# Patient Record
Sex: MALE | Race: WHITE | NOT HISPANIC OR LATINO | URBAN - METROPOLITAN AREA
[De-identification: names, ages, dates, MRNs, and addresses within clinical notes are randomized per-mention and may not be internally consistent; named-entity substitution may affect disease eponyms.]

---

## 2020-07-13 ENCOUNTER — OFFICE VISIT (OUTPATIENT)
Dept: URBAN - METROPOLITAN AREA CLINIC 113 | Facility: CLINIC | Age: 50
End: 2020-07-13

## 2020-07-25 ENCOUNTER — TELEPHONE ENCOUNTER (OUTPATIENT)
Dept: URBAN - METROPOLITAN AREA CLINIC 13 | Facility: CLINIC | Age: 50
End: 2020-07-25

## 2020-07-26 ENCOUNTER — TELEPHONE ENCOUNTER (OUTPATIENT)
Dept: URBAN - METROPOLITAN AREA CLINIC 13 | Facility: CLINIC | Age: 50
End: 2020-07-26

## 2020-07-26 RX ORDER — PREDNISONE 20 MG/1
TAKE 1 TABLET BY MOUTH TWICE DAILY FOR 5 DAYS TABLET ORAL
Qty: 10 | Refills: 0 | Status: ACTIVE | COMMUNITY
Start: 2019-10-07

## 2020-07-26 RX ORDER — DOXYCYCLINE HYCLATE 100 MG/1
TAKE 1 TABLET BY MOUTH EVERY 12 HOURS FOR 10 DAYS TABLET ORAL
Qty: 20 | Refills: 0 | Status: ACTIVE | COMMUNITY
Start: 2020-03-13

## 2020-07-26 RX ORDER — POLYETHYLENE GLYCOL 3350, SODIUM CHLORIDE, SODIUM BICARBONATE AND POTASSIUM CHLORIDE WITH LEMON FLAVOR 420; 11.2; 5.72; 1.48 G/4L; G/4L; G/4L; G/4L
TAKE AS DIRECTED POWDER, FOR SOLUTION ORAL
Qty: 4000 | Refills: 0 | Status: ACTIVE | COMMUNITY
Start: 2020-07-10

## 2020-07-26 RX ORDER — FLUTICASONE PROPIONATE 50 UG/1
USE 2 SPRAY(S) IN EACH NOSTRIL ONCE DAILY FOR 30 DAYS SPRAY, METERED NASAL
Qty: 16 | Refills: 0 | Status: ACTIVE | COMMUNITY
Start: 2019-10-07

## 2020-07-26 RX ORDER — CIPROFLOXACIN HYDROCHLORIDE 500 MG/1
TAKE 1 TABLET BY MOUTH TWICE DAILY TABLET, FILM COATED ORAL
Qty: 28 | Refills: 0 | Status: ACTIVE | COMMUNITY
Start: 2020-06-01

## 2020-07-26 RX ORDER — CLOBETASOL PROPIONATE 0.5 MG/G
APPLY CREAM TOPICALLY TO AFFECTED AREA TWICE DAILY FOR 2 WEEKS ON THEN CREAM TOPICAL
Qty: 15 | Refills: 0 | Status: ACTIVE | COMMUNITY
Start: 2019-07-25

## 2020-07-26 RX ORDER — FLUCONAZOLE 200 MG/1
TAKE 1 TABLET BY MOUTH ONCE DAILY FOR 14 DAYS TABLET ORAL
Qty: 14 | Refills: 0 | Status: ACTIVE | COMMUNITY
Start: 2019-10-02

## 2020-07-26 RX ORDER — CLINDAMYCIN PHOSPHATE 10 MG/ML
APPLY LOTION TOPICALLY TO AFFECTED AREA TWICE DAILY LOTION TOPICAL
Qty: 60 | Refills: 0 | Status: ACTIVE | COMMUNITY
Start: 2019-10-30

## 2020-07-26 RX ORDER — CEPHALEXIN 500 MG/1
TAKE 1 CAPSULE BY MOUTH EVERY 8 HOURS FOR 10 DAYS CAPSULE ORAL
Qty: 30 | Refills: 0 | Status: ACTIVE | COMMUNITY
Start: 2019-12-27

## 2020-07-26 RX ORDER — MUPIROCIN 20 MG/G
APPLY OINTMENT TOPICALLY TO AFFECTED AREA THREE TIMES DAILY FOR 10 DAY OINTMENT TOPICAL
Qty: 22 | Refills: 0 | Status: ACTIVE | COMMUNITY
Start: 2020-03-16

## 2020-07-26 RX ORDER — AMITRIPTYLINE HYDROCHLORIDE 10 MG/1
TAKE 1 TABLET BY MOUTH ONCE DAILY AT BEDTIME FOR 30 DAYS TABLET, FILM COATED ORAL
Qty: 30 | Refills: 0 | Status: ACTIVE | COMMUNITY
Start: 2020-03-13

## 2020-07-26 RX ORDER — PREDNISONE 10 MG/1
TAKE 6 TABLETS BY MOUTH ONCE DAILY FOR 3 DAYS THEN DECREASE BY 1 TABLE TABLET ORAL
Qty: 63 | Refills: 0 | Status: ACTIVE | COMMUNITY
Start: 2019-08-16

## 2020-07-26 RX ORDER — DAPSONE 100 MG/1
TAKE 1 TABLET BY MOUTH ONCE DAILY FOR 30 DAYS TABLET ORAL
Qty: 30 | Refills: 0 | Status: ACTIVE | COMMUNITY
Start: 2020-03-13

## 2020-07-26 RX ORDER — METRONIDAZOLE 500 MG/1
TAKE 1 TABLET BY MOUTH THREE TIMES DAILY TABLET ORAL
Qty: 42 | Refills: 0 | Status: ACTIVE | COMMUNITY
Start: 2020-06-01

## 2020-07-26 RX ORDER — CLINDAMYCIN HYDROCHLORIDE 300 MG/1
TAKE 1 CAPSULE BY MOUTH 4 TIMES DAILY FOR 10 DAYS CAPSULE ORAL
Qty: 40 | Refills: 0 | Status: ACTIVE | COMMUNITY
Start: 2020-04-02

## 2020-07-26 RX ORDER — CEFUROXIME AXETIL 250 MG/1
TAKE 1 TABLET BY MOUTH EVERY 12 HOURS FOR 10 DAYS TABLET ORAL
Qty: 20 | Refills: 0 | Status: ACTIVE | COMMUNITY
Start: 2019-10-07

## 2023-08-01 ENCOUNTER — OFFICE VISIT (OUTPATIENT)
Dept: URBAN - METROPOLITAN AREA CLINIC 113 | Facility: CLINIC | Age: 53
End: 2023-08-01
Payer: COMMERCIAL

## 2023-08-01 ENCOUNTER — DASHBOARD ENCOUNTERS (OUTPATIENT)
Age: 53
End: 2023-08-01

## 2023-08-01 ENCOUNTER — WEB ENCOUNTER (OUTPATIENT)
Dept: URBAN - METROPOLITAN AREA CLINIC 113 | Facility: CLINIC | Age: 53
End: 2023-08-01

## 2023-08-01 VITALS
TEMPERATURE: 97.5 F | SYSTOLIC BLOOD PRESSURE: 132 MMHG | RESPIRATION RATE: 18 BRPM | DIASTOLIC BLOOD PRESSURE: 92 MMHG | HEIGHT: 70 IN | HEART RATE: 73 BPM | BODY MASS INDEX: 30.46 KG/M2 | WEIGHT: 212.8 LBS

## 2023-08-01 DIAGNOSIS — Z90.49 HISTORY OF PARTIAL COLECTOMY: ICD-10-CM

## 2023-08-01 DIAGNOSIS — K56.699 STRICTURE OF SIGMOID COLON: ICD-10-CM

## 2023-08-01 DIAGNOSIS — R10.32 LLQ PAIN: ICD-10-CM

## 2023-08-01 DIAGNOSIS — Z12.11 COLON CANCER SCREENING: ICD-10-CM

## 2023-08-01 DIAGNOSIS — K59.09 CHRONIC CONSTIPATION: ICD-10-CM

## 2023-08-01 PROBLEM — 428305005: Status: ACTIVE | Noted: 2023-08-01

## 2023-08-01 PROCEDURE — 99204 OFFICE O/P NEW MOD 45 MIN: CPT

## 2023-08-01 RX ORDER — POLYETHYLENE GLYCOL 3350, SODIUM CHLORIDE, SODIUM BICARBONATE AND POTASSIUM CHLORIDE WITH LEMON FLAVOR 420; 11.2; 5.72; 1.48 G/4L; G/4L; G/4L; G/4L
TAKE AS DIRECTED POWDER, FOR SOLUTION ORAL
Qty: 4000 | Refills: 0 | Status: ON HOLD | COMMUNITY
Start: 2020-07-10

## 2023-08-01 RX ORDER — PREDNISONE 20 MG/1
TAKE 1 TABLET BY MOUTH TWICE DAILY FOR 5 DAYS TABLET ORAL
Qty: 10 | Refills: 0 | Status: ON HOLD | COMMUNITY
Start: 2019-10-07

## 2023-08-01 RX ORDER — CIPROFLOXACIN HYDROCHLORIDE 500 MG/1
TAKE 1 TABLET BY MOUTH TWICE DAILY TABLET, FILM COATED ORAL
Qty: 28 | Refills: 0 | Status: ON HOLD | COMMUNITY
Start: 2020-06-01

## 2023-08-01 RX ORDER — CLINDAMYCIN PHOSPHATE 10 MG/ML
APPLY LOTION TOPICALLY TO AFFECTED AREA TWICE DAILY LOTION TOPICAL
Qty: 60 | Refills: 0 | Status: ON HOLD | COMMUNITY
Start: 2019-10-30

## 2023-08-01 RX ORDER — FLUTICASONE PROPIONATE 50 UG/1
USE 2 SPRAY(S) IN EACH NOSTRIL ONCE DAILY FOR 30 DAYS SPRAY, METERED NASAL
Qty: 16 | Refills: 0 | Status: ON HOLD | COMMUNITY
Start: 2019-10-07

## 2023-08-01 RX ORDER — MINOCYCLINE HYDROCHLORIDE 100 MG/1
1 CAPSULE CAPSULE ORAL ONCE A DAY
Status: ACTIVE | COMMUNITY

## 2023-08-01 RX ORDER — METRONIDAZOLE 500 MG/1
TAKE 1 TABLET BY MOUTH THREE TIMES DAILY TABLET ORAL
Qty: 42 | Refills: 0 | Status: ON HOLD | COMMUNITY
Start: 2020-06-01

## 2023-08-01 RX ORDER — DOXYCYCLINE HYCLATE 100 MG/1
TAKE 1 TABLET BY MOUTH EVERY 12 HOURS FOR 10 DAYS TABLET ORAL
Qty: 20 | Refills: 0 | Status: ON HOLD | COMMUNITY
Start: 2020-03-13

## 2023-08-01 RX ORDER — DAPSONE 100 MG/1
TAKE 1 TABLET BY MOUTH ONCE DAILY FOR 30 DAYS TABLET ORAL
Qty: 30 | Refills: 0 | Status: ON HOLD | COMMUNITY
Start: 2020-03-13

## 2023-08-01 RX ORDER — CEPHALEXIN 500 MG/1
TAKE 1 CAPSULE BY MOUTH EVERY 8 HOURS FOR 10 DAYS CAPSULE ORAL
Qty: 30 | Refills: 0 | Status: ON HOLD | COMMUNITY
Start: 2019-12-27

## 2023-08-01 RX ORDER — CLOBETASOL PROPIONATE 0.5 MG/G
APPLY CREAM TOPICALLY TO AFFECTED AREA TWICE DAILY FOR 2 WEEKS ON THEN CREAM TOPICAL
Qty: 15 | Refills: 0 | Status: ACTIVE | COMMUNITY
Start: 2019-07-25

## 2023-08-01 RX ORDER — PREDNISONE 10 MG/1
TAKE 6 TABLETS BY MOUTH ONCE DAILY FOR 3 DAYS THEN DECREASE BY 1 TABLE TABLET ORAL
Qty: 63 | Refills: 0 | Status: ON HOLD | COMMUNITY
Start: 2019-08-16

## 2023-08-01 RX ORDER — CEFUROXIME AXETIL 250 MG/1
TAKE 1 TABLET BY MOUTH EVERY 12 HOURS FOR 10 DAYS TABLET ORAL
Qty: 20 | Refills: 0 | Status: ON HOLD | COMMUNITY
Start: 2019-10-07

## 2023-08-01 RX ORDER — DICYCLOMINE HYDROCHLORIDE 10 MG/1
1 CAPSULE CAPSULE ORAL
Qty: 90 | Refills: 1 | OUTPATIENT
Start: 2023-08-01 | End: 2023-09-30

## 2023-08-01 RX ORDER — AMITRIPTYLINE HYDROCHLORIDE 10 MG/1
TAKE 1 TABLET BY MOUTH ONCE DAILY AT BEDTIME FOR 30 DAYS TABLET, FILM COATED ORAL
Qty: 30 | Refills: 0 | Status: ON HOLD | COMMUNITY
Start: 2020-03-13

## 2023-08-01 RX ORDER — POLYETHYLENE GLYCOL 3350, SODIUM CHLORIDE, SODIUM BICARBONATE, POTASSIUM CHLORIDE 420; 11.2; 5.72; 1.48 G/4L; G/4L; G/4L; G/4L
AS DIRECTED POWDER, FOR SOLUTION ORAL ONCE
Qty: 420 GM | Refills: 0 | OUTPATIENT
Start: 2023-08-01 | End: 2023-08-31

## 2023-08-01 RX ORDER — MUPIROCIN 20 MG/G
APPLY OINTMENT TOPICALLY TO AFFECTED AREA THREE TIMES DAILY FOR 10 DAY OINTMENT TOPICAL
Qty: 22 | Refills: 0 | Status: ON HOLD | COMMUNITY
Start: 2020-03-16

## 2023-08-01 RX ORDER — CLINDAMYCIN HYDROCHLORIDE 300 MG/1
TAKE 1 CAPSULE BY MOUTH 4 TIMES DAILY FOR 10 DAYS CAPSULE ORAL
Qty: 40 | Refills: 0 | Status: ON HOLD | COMMUNITY
Start: 2020-04-02

## 2023-08-01 RX ORDER — FLUCONAZOLE 200 MG/1
TAKE 1 TABLET BY MOUTH ONCE DAILY FOR 14 DAYS TABLET ORAL
Qty: 14 | Refills: 0 | Status: ON HOLD | COMMUNITY
Start: 2019-10-02

## 2023-08-01 NOTE — HPI-TODAY'S VISIT:
52-year-old male presents for evaluation of constipation.  He was originally seen by Dr. Jordan in 2000 and for evidence of colon polyps on barium enema.  He also complained of abdominal pain, nausea, vomiting, weight loss and change in bowel habits.  Hospital colonoscopy in 2000 was negative for evidence of Crohn's disease though did show a thrombosed hemorrhoid versus anorectal mass.  He was referred to Dr. Cotton.  Flexible sigmoidoscopy performed in June 2020 with Dr. Porras revealed a benign-appearing intrinsic moderate stenosis measuring 10 cm in the sigmoid colon.  This was traversed and biopsied.  Exam was otherwise unremarkable.  He was placed on Solu-Medrol to hopefully decrease inflammation until pathology was obtained.  Pathology revealed a very mild acute colitis without features of chronicity.  Features of Crohn's disease were not identified.  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 The pathology revealed diverticular perforation with abscess formation and serosal adhesions. This was performed by Dr. Bell.  He had an appt for colostomy reversal however a few days prior he had severe LLQ pain. His stricture was found to be infected. He did have severe constipation recently. He has since undergone colostomy reversal. He went five days without a bowel movement. He took MiraLAX and Metamucil. He does not feel he has complete evacuation. He has not had a full colonoscopy since 2000. No family history of colon cancer. He does have a chronic LLQ pain with bowel movements. This is described as  twinge.  Denies fevers, chills or blood per rectum. He wishes to re-establish care with Dr. Jordan.

## 2023-09-14 ENCOUNTER — WEB ENCOUNTER (OUTPATIENT)
Dept: URBAN - METROPOLITAN AREA SURGERY CENTER 25 | Facility: SURGERY CENTER | Age: 53
End: 2023-09-14

## 2023-09-18 ENCOUNTER — CLAIMS CREATED FROM THE CLAIM WINDOW (OUTPATIENT)
Dept: URBAN - METROPOLITAN AREA SURGERY CENTER 25 | Facility: SURGERY CENTER | Age: 53
End: 2023-09-18

## 2023-09-18 ENCOUNTER — OUT OF OFFICE VISIT (OUTPATIENT)
Dept: URBAN - METROPOLITAN AREA SURGERY CENTER 25 | Facility: SURGERY CENTER | Age: 53
End: 2023-09-18
Payer: COMMERCIAL

## 2023-09-18 DIAGNOSIS — Z12.11 COLON CANCER SCREENING (HIGH RISK): ICD-10-CM

## 2023-09-18 DIAGNOSIS — Z98.0 INTESTINAL BYPASS AND ANASTOMOSIS STATUS: ICD-10-CM

## 2023-09-18 DIAGNOSIS — Z12.11 COLON CANCER SCREENING: ICD-10-CM

## 2023-09-18 DIAGNOSIS — Z98.0 H/O BILLROTH II OPERATION: ICD-10-CM

## 2023-09-18 PROCEDURE — G8907 PT DOC NO EVENTS ON DISCHARG: HCPCS | Performed by: INTERNAL MEDICINE

## 2023-09-18 PROCEDURE — 00812 ANES LWR INTST SCR COLSC: CPT | Performed by: ANESTHESIOLOGY

## 2023-09-18 PROCEDURE — 45378 DIAGNOSTIC COLONOSCOPY: CPT | Performed by: INTERNAL MEDICINE

## 2023-09-18 PROCEDURE — 00812 ANES LWR INTST SCR COLSC: CPT | Performed by: ANESTHESIOLOGIST ASSISTANT

## 2023-09-18 RX ORDER — DOXYCYCLINE HYCLATE 100 MG/1
TAKE 1 TABLET BY MOUTH EVERY 12 HOURS FOR 10 DAYS TABLET ORAL
Qty: 20 | Refills: 0 | Status: ON HOLD | COMMUNITY
Start: 2020-03-13

## 2023-09-18 RX ORDER — FLUTICASONE PROPIONATE 50 UG/1
USE 2 SPRAY(S) IN EACH NOSTRIL ONCE DAILY FOR 30 DAYS SPRAY, METERED NASAL
Qty: 16 | Refills: 0 | Status: ON HOLD | COMMUNITY
Start: 2019-10-07

## 2023-09-18 RX ORDER — POLYETHYLENE GLYCOL 3350, SODIUM CHLORIDE, SODIUM BICARBONATE AND POTASSIUM CHLORIDE WITH LEMON FLAVOR 420; 11.2; 5.72; 1.48 G/4L; G/4L; G/4L; G/4L
TAKE AS DIRECTED POWDER, FOR SOLUTION ORAL
Qty: 4000 | Refills: 0 | Status: ON HOLD | COMMUNITY
Start: 2020-07-10

## 2023-09-18 RX ORDER — DICYCLOMINE HYDROCHLORIDE 10 MG/1
1 CAPSULE CAPSULE ORAL
Qty: 90 | Refills: 1 | Status: ACTIVE | COMMUNITY
Start: 2023-08-01 | End: 2023-09-30

## 2023-09-18 RX ORDER — CLINDAMYCIN PHOSPHATE 10 MG/ML
APPLY LOTION TOPICALLY TO AFFECTED AREA TWICE DAILY LOTION TOPICAL
Qty: 60 | Refills: 0 | Status: ON HOLD | COMMUNITY
Start: 2019-10-30

## 2023-09-18 RX ORDER — CEFUROXIME AXETIL 250 MG/1
TAKE 1 TABLET BY MOUTH EVERY 12 HOURS FOR 10 DAYS TABLET ORAL
Qty: 20 | Refills: 0 | Status: ON HOLD | COMMUNITY
Start: 2019-10-07

## 2023-09-18 RX ORDER — CLOBETASOL PROPIONATE 0.5 MG/G
APPLY CREAM TOPICALLY TO AFFECTED AREA TWICE DAILY FOR 2 WEEKS ON THEN CREAM TOPICAL
Qty: 15 | Refills: 0 | Status: ACTIVE | COMMUNITY
Start: 2019-07-25

## 2023-09-18 RX ORDER — CEPHALEXIN 500 MG/1
TAKE 1 CAPSULE BY MOUTH EVERY 8 HOURS FOR 10 DAYS CAPSULE ORAL
Qty: 30 | Refills: 0 | Status: ON HOLD | COMMUNITY
Start: 2019-12-27

## 2023-09-18 RX ORDER — CIPROFLOXACIN HYDROCHLORIDE 500 MG/1
TAKE 1 TABLET BY MOUTH TWICE DAILY TABLET, FILM COATED ORAL
Qty: 28 | Refills: 0 | Status: ON HOLD | COMMUNITY
Start: 2020-06-01

## 2023-09-18 RX ORDER — CLINDAMYCIN HYDROCHLORIDE 300 MG/1
TAKE 1 CAPSULE BY MOUTH 4 TIMES DAILY FOR 10 DAYS CAPSULE ORAL
Qty: 40 | Refills: 0 | Status: ON HOLD | COMMUNITY
Start: 2020-04-02

## 2023-09-18 RX ORDER — AMITRIPTYLINE HYDROCHLORIDE 10 MG/1
TAKE 1 TABLET BY MOUTH ONCE DAILY AT BEDTIME FOR 30 DAYS TABLET, FILM COATED ORAL
Qty: 30 | Refills: 0 | Status: ON HOLD | COMMUNITY
Start: 2020-03-13

## 2023-09-18 RX ORDER — MUPIROCIN 20 MG/G
APPLY OINTMENT TOPICALLY TO AFFECTED AREA THREE TIMES DAILY FOR 10 DAY OINTMENT TOPICAL
Qty: 22 | Refills: 0 | Status: ON HOLD | COMMUNITY
Start: 2020-03-16

## 2023-09-18 RX ORDER — MINOCYCLINE HYDROCHLORIDE 100 MG/1
1 CAPSULE CAPSULE ORAL ONCE A DAY
Status: ACTIVE | COMMUNITY

## 2023-09-18 RX ORDER — METRONIDAZOLE 500 MG/1
TAKE 1 TABLET BY MOUTH THREE TIMES DAILY TABLET ORAL
Qty: 42 | Refills: 0 | Status: ON HOLD | COMMUNITY
Start: 2020-06-01

## 2023-09-18 RX ORDER — DAPSONE 100 MG/1
TAKE 1 TABLET BY MOUTH ONCE DAILY FOR 30 DAYS TABLET ORAL
Qty: 30 | Refills: 0 | Status: ON HOLD | COMMUNITY
Start: 2020-03-13

## 2023-09-18 RX ORDER — FLUCONAZOLE 200 MG/1
TAKE 1 TABLET BY MOUTH ONCE DAILY FOR 14 DAYS TABLET ORAL
Qty: 14 | Refills: 0 | Status: ON HOLD | COMMUNITY
Start: 2019-10-02

## 2023-09-18 RX ORDER — PREDNISONE 10 MG/1
TAKE 6 TABLETS BY MOUTH ONCE DAILY FOR 3 DAYS THEN DECREASE BY 1 TABLE TABLET ORAL
Qty: 63 | Refills: 0 | Status: ON HOLD | COMMUNITY
Start: 2019-08-16

## 2023-09-18 RX ORDER — PREDNISONE 20 MG/1
TAKE 1 TABLET BY MOUTH TWICE DAILY FOR 5 DAYS TABLET ORAL
Qty: 10 | Refills: 0 | Status: ON HOLD | COMMUNITY
Start: 2019-10-07